# Patient Record
Sex: FEMALE | Race: WHITE | NOT HISPANIC OR LATINO | Employment: FULL TIME | URBAN - METROPOLITAN AREA
[De-identification: names, ages, dates, MRNs, and addresses within clinical notes are randomized per-mention and may not be internally consistent; named-entity substitution may affect disease eponyms.]

---

## 2017-01-09 ENCOUNTER — HOSPITAL ENCOUNTER (OUTPATIENT)
Dept: RADIOLOGY | Age: 52
Discharge: HOME | End: 2017-01-09
Payer: COMMERCIAL

## 2017-01-09 DIAGNOSIS — C49.A0 GASTROINTESTINAL STROMAL TUMOR (GIST) (HCC): ICD-10-CM

## 2017-01-09 PROCEDURE — 74177 CT ABD & PELVIS W/CONTRAST: CPT

## 2017-01-09 RX ADMIN — IOHEXOL 100 ML: 350 INJECTION, SOLUTION INTRAVENOUS at 15:44

## 2017-01-24 ENCOUNTER — ALLSCRIPTS OFFICE VISIT (OUTPATIENT)
Dept: OTHER | Facility: OTHER | Age: 52
End: 2017-01-24

## 2017-01-24 ENCOUNTER — TRANSCRIBE ORDERS (OUTPATIENT)
Dept: ADMINISTRATIVE | Facility: HOSPITAL | Age: 52
End: 2017-01-24

## 2017-01-24 DIAGNOSIS — C49.A0 MALIGNANT GASTROINTESTINAL STROMAL TUMOR, UNSPECIFIED SITE (HCC): Primary | ICD-10-CM

## 2018-01-01 DIAGNOSIS — Z12.31 ENCOUNTER FOR SCREENING MAMMOGRAM FOR MALIGNANT NEOPLASM OF BREAST: ICD-10-CM

## 2018-01-01 DIAGNOSIS — C49.A0 GASTROINTESTINAL STROMAL TUMOR (GIST) (HCC): ICD-10-CM

## 2018-01-01 DIAGNOSIS — E87.5 HYPERKALEMIA: ICD-10-CM

## 2018-01-14 VITALS
SYSTOLIC BLOOD PRESSURE: 108 MMHG | RESPIRATION RATE: 18 BRPM | WEIGHT: 134 LBS | HEART RATE: 76 BPM | DIASTOLIC BLOOD PRESSURE: 78 MMHG | OXYGEN SATURATION: 97 % | HEIGHT: 62 IN | BODY MASS INDEX: 24.66 KG/M2 | TEMPERATURE: 97.4 F

## 2018-01-16 ENCOUNTER — HOSPITAL ENCOUNTER (OUTPATIENT)
Dept: RADIOLOGY | Age: 53
Discharge: HOME | End: 2018-01-16
Payer: COMMERCIAL

## 2018-01-16 DIAGNOSIS — C49.A0 GASTROINTESTINAL STROMAL TUMOR (GIST) (HCC): ICD-10-CM

## 2018-01-16 PROCEDURE — 74177 CT ABD & PELVIS W/CONTRAST: CPT

## 2018-01-16 PROCEDURE — 71260 CT THORAX DX C+: CPT

## 2018-01-16 RX ADMIN — IOHEXOL 100 ML: 350 INJECTION, SOLUTION INTRAVENOUS at 16:16

## 2018-01-23 ENCOUNTER — TRANSCRIBE ORDERS (OUTPATIENT)
Dept: ADMINISTRATIVE | Facility: HOSPITAL | Age: 53
End: 2018-01-23

## 2018-01-23 ENCOUNTER — ALLSCRIPTS OFFICE VISIT (OUTPATIENT)
Dept: OTHER | Facility: OTHER | Age: 53
End: 2018-01-23

## 2018-01-23 DIAGNOSIS — C49.A0 STROMAL TUMOR OF DIGESTIVE SYSTEM (HCC): Primary | ICD-10-CM

## 2018-01-24 NOTE — PROGRESS NOTES
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 A0)   2  Encounter for screening mammogram for breast cancer (V76 12) (Z12 31)   3  Hyperkalemia (276 7) (E87 5)    Plan   Encounter for screening mammogram for breast cancer, GIST (gastrointestinal stromal    tumor), malignant, Hyperkalemia    · (1) BASIC METABOLIC PROFILE; Status:Active; Requested TJE:[id number]; Perform:[facility]; KAREN:[id number];REVESFD;MGL:[id number] for screening mammogram for breast cancer, GIST (gastrointestinal stromal tumor), malignant, Hyperkalemia; Ordered By:Aaron Oshea;   · (1) CBC/PLT/DIFF; Status:Active; Requested DVI:[id number];    Perform:[facility]; IOE:[id number]; Last Updated By:Tim Almanza; 1/23/2018 4:17:52 PM;Ordered;For:Encounter for screening mammogram for breast cancer, GIST (gastrointestinal stromal tumor), malignant, Hyperkalemia; Ordered By:Aaron Oshea;   · (1) COMPREHENSIVE METABOLIC PANEL; Status:Active; Requested ZYC:[id number];    Perform:[facility]; GSB:[id number]; Last Updated By:Tim Almanza; 1/23/2018 4:17:52 PM;Ordered;For:Encounter for screening mammogram for breast cancer, GIST (gastrointestinal stromal tumor), malignant, Hyperkalemia; Ordered By:Aaron Oshea;   · Follow-up visit in 1 year Evaluation and Treatment  Follow-up  Status: Hold For -    Scheduling  Requested for: [id number]   Ordered; For: Encounter for screening mammogram for breast cancer, GIST (gastrointestinal stromal tumor), malignant, Hyperkalemia; Ordered By: Harini Ruiz Performed:  Due: 65FDO4953   · * CT CHEST ABDOMEN PELVIS W CONTRAST; Status:Need Information - Financial    Authorization; Requested WOC:[id number] 03:30PM;    Perform:[facility]; MCL:[id number];  Last Updated By:Tim Almanza; 1/23/2018 4:27:36 PM;Ordered;For:Encounter for screening mammogram for breast cancer, GIST (gastrointestinal stromal tumor), malignant, Hyperkalemia; Ordered By:Praneeth Oshea Junior;   · MAMMO SCREENING BILATERAL W 3D & CAD; Status:Active; Requested XGP:[id number]    04:00PM;    Perform:[facility]; Order Comments:HARIS LAMAR; RNZ:[id number]; Last Updated By:Satya Almanza; 1/23/2018 4:28:20 PM;Ordered;For:Encounter for screening mammogram for breast cancer, GIST (gastrointestinal stromal tumor), malignant, Hyperkalemia; Ordered By:Praneeth Oshea Junior; Discussion/Summary   Discussion Summary:    Glennon Olszewski remains in clinical remission of gastrointestinal stromal tumor of the duodenum, 3 5 cm, 3 mitotic figures per 25 HPF, resected in April 2011  Annual abdominal and pelvic CT is recommended according to NCCN guidelines version 1  2018 for GI ST   is due for bilateral mammogram including follow-up of the 1 3 cm focal asymmetry at the 12 o'clock position of the right breast 7 cm from the nipple  patient is aware to seek medical attention for abdominal pain, change in bowel habits, excessive fatigue, further problems arise  Otherwise, I plan to see her again in one year with contrast-enhanced CT scan of the abdomen and pelvis at that time  Chief Complaint   Chief Complaint Free Text Note Form: Glennon Olszewski was seen in followup today in regards to gastrointestinal stromal tumor of the duodenum, 3 5 cm, 3 mitotic figures per 25 HPF, resected in April 2011  History of Present Illness   HPI: She has been feeling well  She completed 3 years of Gleevec 400 mg daily in June 2014  She continues to get regular exercise walking the dog 1mile per day  Previous Therapy:    Gastrointestinal stromal tumor 3 5 cm of the duodenum  The tumor is present at the peripheral tissue edges, three mitotic figures per 25 HPF, Ki-67 index almost 5%, laparoscopic resection of the duodenal mass in April 2011, imatinib from June 2011 until June 2014  Review of Systems   Complete-Female:      Constitutional: She has been feeling well        Eyes: no eyesight problems  ENT: no nosebleeds-- and-- no hearing loss  Cardiovascular: no chest pain-- and-- no lower extremity edema  Respiratory: no shortness of breath-- and-- no cough  Gastrointestinal: no abdominal pain,-- no constipation-- and-- no diarrhea  Musculoskeletal: No joint pain or back pain  Neurological: no headache-- and-- no difficulty walking  Psychiatric: no emotional problems  Hematologic/Lymphatic: no tendency for easy bruising  Active Problems   1  Anemia (285 9) (D64 9)   2  Breast asymmetry (611 89) (N64 89)   3  GIST (gastrointestinal stromal tumor), 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) (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 A0)    Surgical History   1  History of Hysterectomy    Family History   Mother    1  Family history of diabetes mellitus (V18 0) (Z83 3)  Aunt    2  Family history of malignant neoplasm of brain (V16 8) (Z80 8)    Social History    · Former smoker (V15 82) (T10 385)   · No alcohol use    Current Meds    1  Daily Vitamin Oral Tablet; Therapy: (Recorded:36Qtr6204) to Recorded   2  PROzac 10 MG TABS; Therapy: (Recorded:51Snm8300) to Recorded    Allergies   1  No Known Drug Allergies    Vitals   Vital Signs    Recorded: 03TFS0045 03:43PM   Temperature 98 4 F   Heart Rate 80   Respiration 18   Systolic 633   Diastolic 80   Height 5 ft 1 5 in   Weight 136 lb    BMI Calculated 25 28   BSA Calculated 1 61   O2 Saturation 98     Physical Exam        Constitutional She appears well  Eyes EOMI  Ears, Nose, Mouth, and Throat Oropharynx is clear  Pulmonary      Auscultation of lungs: Clear to auscultation  Cardiovascular Heart has regular rate and rhythm  -- No lower extremity edema  Abdomen      Abdomen: Non-tender, no masses  Liver and spleen: No hepatomegaly or splenomegaly  Lymphatic      Palpation of lymph nodes in neck: No lymphadenopathy  -- No axillary or inguinal lymphadenopathy        Musculoskeletal      Gait and station: Normal  Neurologic Neurological is grossly intact  Psychiatric      Orientation to person, place, and time: Normal        Mood and affect: Normal        Additional Exam:  (Breast examination was not done today  )  ECOG 0       Results/Data   * CT CHEST ABDOMEN PELVIS W CONTRAST [id number] 03:16PM Kesha Stewart Order Number: [id number]       - Patient Instructions: [address]      Test Name Result Flag Reference   CT CHEST ABDOMEN PELVIS W CONTRAST (Report)     CT CHEST, ABDOMEN AND PELVIS WITH IV CONTRAST           INDICATION: History of gastrointestinal stromal tumor of the duodenum resected April 2011  This is an annual follow up  No recurrent disease on the prior annual follow-up  COMPARISON: CT abdomen pelvis 1/9/2017  CT chest abdomen pelvis 1/16/2015  TECHNIQUE: CT examination of the chest, abdomen and pelvis was performed  Reformatted images were created in axial, sagittal, and coronal planes  Radiation dose length product (DLP) for this visit: 298 mGy-cm   This examination, like all CT scans performed in the [facility], was performed utilizing techniques to minimize radiation dose exposure, including the use of iterative       reconstruction and automated exposure control  IV Contrast: 100 mL of iohexol (OMNIPAQUE)  350 Multi-dose         Enteric Contrast: Enteric contrast was administered  FINDINGS:           CHEST           LUNGS: Stable 3 mm right middle lobe nodule is unchanged over 2 years and therefore benign  No new nodules or pulmonary findings  Mild left basilar scarring  PLEURA: Unremarkable  HEART/GREAT VESSELS: Unremarkable for patient's age  MEDIASTINUM AND DANNY: Unremarkable  CHEST WALL AND LOWER NECK:    Unremarkable  ABDOMEN           LIVER/BILIARY TREE: Tiny scattered stable hepatic hypodensities unchanged from prior studies  GALLBLADDER: No calcified gallstones  No pericholecystic inflammatory change  SPLEEN: Unremarkable  PANCREAS: Unremarkable  ADRENAL GLANDS: Unremarkable  KIDNEYS/URETERS: Unremarkable  No hydronephrosis  STOMACH AND BOWEL: Unremarkable  APPENDIX: No findings to suggest appendicitis  ABDOMINOPELVIC CAVITY: No ascites or free intraperitoneal air  No lymphadenopathy  VESSELS: Unremarkable for patient's age  PELVIS           REPRODUCTIVE ORGANS: Unremarkable for patient's age  URINARY BLADDER: Unremarkable  ABDOMINAL WALL/INGUINAL REGIONS: Unremarkable  OSSEOUS STRUCTURES: No acute fracture or destructive osseous lesion  IMPRESSION:           No signs of recurrent, residual or metastatic malignancy within the chest, abdomen or pelvis in this patient with resected duodenal gastrointestinal stromal tumor in 2011  Workstation performed: [id number]           Signed by:      Calli Ramos MD      1/17/18      Results Form:    Results      Radiology Bilateral screening mammogram from July 21, 2016: Breast tissue is heterogeneously dense, asymmetry in the subareolar region of the right breast measuring 12 mm requires additional mammographic views and breast ultrasound  Additional views of the right breast and right breast ultrasound on August 4, 2016: There is no persistent abnormality with additional mammographic imaging and no solid or cystic masses on targeted ultrasound, six-month follow-up right breast mammogram is recommended to confirm stability of this fibroglandular pattern  scan of the abdomen and pelvis with contrast from January 9, 2017: There are stable tiny hypodense lesions in the liver dating back to 2014 and are likely benign, stomach and bowel are unremarkable, no abdominal or pelvic lymphadenopathy   right breast mammogram and ultrasound on February 7, 2017: There is redemonstration of a 1 3 cm focal asymmetry with the appearance of confluent fibroglandular tissue unchanged from the prior study of 6 months ago  scan of the chest, abdomen pelvis with contrast from January 16, 2018: There is a stable 3 mm right middle lobe nodule unchanged for over 2 years, no recurrence, residual or metastatic malignancy within the chest, abdomen and pelvis  Lab Results From January 6, 2018: WBC is 5 2, hemoglobin 14 1, platelets 094, creatinine 0 85, potassium 5 6, alk-phos 100, bili 0 5, AST 18, ALT 31        Signatures    Electronically signed by : SHERMAN Martinez ; Jan 23 2018  5:24PM EST                       (Author)

## 2019-01-11 ENCOUNTER — HOSPITAL ENCOUNTER (OUTPATIENT)
Dept: RADIOLOGY | Age: 54
Discharge: HOME | End: 2019-01-11
Payer: COMMERCIAL

## 2019-01-11 DIAGNOSIS — C49.A0 STROMAL TUMOR OF DIGESTIVE SYSTEM (HCC): ICD-10-CM

## 2019-01-11 PROCEDURE — 71260 CT THORAX DX C+: CPT

## 2019-01-11 PROCEDURE — 74177 CT ABD & PELVIS W/CONTRAST: CPT

## 2019-01-11 RX ADMIN — IOHEXOL 100 ML: 350 INJECTION, SOLUTION INTRAVENOUS at 15:29

## 2019-01-22 ENCOUNTER — OFFICE VISIT (OUTPATIENT)
Dept: HEMATOLOGY ONCOLOGY | Facility: CLINIC | Age: 54
End: 2019-01-22
Payer: COMMERCIAL

## 2019-01-22 VITALS
DIASTOLIC BLOOD PRESSURE: 78 MMHG | HEIGHT: 62 IN | HEART RATE: 79 BPM | OXYGEN SATURATION: 98 % | SYSTOLIC BLOOD PRESSURE: 122 MMHG | RESPIRATION RATE: 18 BRPM | BODY MASS INDEX: 25.03 KG/M2 | TEMPERATURE: 98.1 F | WEIGHT: 136 LBS

## 2019-01-22 DIAGNOSIS — C49.A3 GASTROINTESTINAL STROMAL TUMOR (GIST) OF DUODENUM (HCC): Primary | ICD-10-CM

## 2019-01-22 PROCEDURE — 99214 OFFICE O/P EST MOD 30 MIN: CPT | Performed by: INTERNAL MEDICINE

## 2019-01-22 RX ORDER — FLUOXETINE 10 MG/1
TABLET, FILM COATED ORAL
COMMUNITY

## 2019-01-22 NOTE — PATIENT INSTRUCTIONS
The  patient is aware to seek medical attention for abdominal pain, change in bowel habits, excessive fatigue, or if new problems arise

## 2019-01-22 NOTE — PROGRESS NOTES
1/22/2019    Sandra Leaver     She has been feeling well  She gets regular exercise including walking her dog from mi per day    Hematology/Oncology History:    Gastrointestinal stromal tumor 3 5 cm of the duodenum  The tumor is present at the peripheral tissue edges, three mitotic figures per 25 HPF, Ki-67 index almost 5%, laparoscopic resection of the duodenal mass in April 2011, imatinib from June 2011 until June 2014  Cancer surveillance:    Colonoscopy was done by Jelani Cramer on January 4, 2019:  Normal colonoscopy, surveillance in 10 years is recommended     Review of systems:      Constitutional: She has been feeling well, she denies chills or sweats  HEENT: She denies nose bleeds  She denies oral cavity or throat soreness  Cardiovascular:  She denies chest pain, no edema  Respiratory:  She denies cough or dyspnea  GI:  Her appetite has been good  No abdominal pain  Bowel habits have been formed and regular  Musculoskeletal:  She denies joint pain or back pain  Neurologic: She denies headache or paresthesias  She denies difficulty walking  Skin:  She denies rash  Psychiatric:  She denies emotional problems  Hematologic:  She denies easy bruising  Physical Examination:    Blood pressure 122/78, pulse 79, temperature 98 1 °F (36 7 °C), resp  rate 18, height 5' 1 5" (1 562 m), weight 61 7 kg (136 lb), SpO2 98 %  Body surface area is 1 61 meters squared  She appears well  EOMI  No hearing deficit  Orophaynx clear  No lymphadenopathy of the neck  No axillary lymphadenopathy  (Breast examination was not done today )   Lungs are clear bilaterally  Heart has regular rate and rhythm  No hepatic or splenic enlargement  No inguinal lymphadenopathy  No lower extremity edema  No ecchymoses  Normal gait and station  Neurological is grossly intact  Oriented x3, normal mood and affect      ECOG 0    Laboratory:    From January 19, 2019:  WBC 3 7 with ANC 2 2, hemoglobin 13 6, platelets 430, creatinine 0 85, alk-phos 99, bili 0 7, AST 15, ALT 20    Bilateral screening mammogram from February 1, 2018:  Breast tissue is heterogeneously dense, no suspicious masses, calcifications or other abnormalities    CT of the chest, abdomen pelvis with contrast from January 11, 2019: There is a stable 3 mm RML nodule, mediastinum and hilar are unremarkable, there are subcentimeter hepatic lesions which most likely represent hepatic cysts, spleen, pancreas, adrenals, kidneys are unremarkable, there is an 18 x 9 mm structure extending intramurally from the posterior gastric body which could represent a normal fold or intraluminal gastric material verses recurrent gist, no abdominal pelvic lymphadenopathy, no destructive osseous lesion    Assessment/Plan:    Pamula Kawasaki remains in clinical remission of gastrointestinal stromal tumor of the duodenum, 3 5 cm, 3 mitotic figures per 25 HPF, resected in April 2011  Annual abdominal and pelvic CT is recommended according to NCCN guidelines version 1  2018 for GIST, and the patient is in agreement with CT scanning in 1 year  She is agreeable to endoscopic evaluation of the intraluminal structure extending from the posterior gastric wall and arrangements are being made for follow-up with Fazal Benitez in this regard  I reviewed this case with Dr Patel by telephone today  The patient is scheduled for bilateral mammogram for breast cancer screening on February 6, 2019  She is aware to seek medical attention for abdominal pain, change in bowel habits, excessive fatigue, further problems arise  Otherwise, I plan to see her again in one year

## 2020-01-14 ENCOUNTER — HOSPITAL ENCOUNTER (OUTPATIENT)
Dept: RADIOLOGY | Age: 55
Discharge: HOME | End: 2020-01-14
Payer: COMMERCIAL

## 2020-01-14 DIAGNOSIS — C49.A3 GASTROINTESTINAL STROMAL TUMOR (GIST) OF DUODENUM (HCC): ICD-10-CM

## 2020-01-14 PROCEDURE — 71260 CT THORAX DX C+: CPT

## 2020-01-14 PROCEDURE — 74177 CT ABD & PELVIS W/CONTRAST: CPT

## 2020-01-14 RX ADMIN — IOHEXOL 100 ML: 350 INJECTION, SOLUTION INTRAVENOUS at 08:31

## 2020-01-21 ENCOUNTER — OFFICE VISIT (OUTPATIENT)
Dept: HEMATOLOGY ONCOLOGY | Facility: CLINIC | Age: 55
End: 2020-01-21
Payer: COMMERCIAL

## 2020-01-21 VITALS
WEIGHT: 136 LBS | TEMPERATURE: 98.6 F | HEIGHT: 62 IN | BODY MASS INDEX: 25.03 KG/M2 | OXYGEN SATURATION: 98 % | HEART RATE: 78 BPM | SYSTOLIC BLOOD PRESSURE: 120 MMHG | RESPIRATION RATE: 16 BRPM | DIASTOLIC BLOOD PRESSURE: 80 MMHG

## 2020-01-21 DIAGNOSIS — E87.5 HYPERKALEMIA: ICD-10-CM

## 2020-01-21 DIAGNOSIS — C49.A3 GASTROINTESTINAL STROMAL TUMOR (GIST) OF DUODENUM (HCC): Primary | ICD-10-CM

## 2020-01-21 PROCEDURE — 99214 OFFICE O/P EST MOD 30 MIN: CPT | Performed by: INTERNAL MEDICINE

## 2020-01-21 NOTE — PROGRESS NOTES
1/21/2020    Yadiel Been    She has been feeling well  She continues to get regular exercise including walking her dog a1/2 mile per day  She has been time  EGD was done on January 28, 2019: There was a small sliding hiatal hernia, otherwise normal EGD  Hematology/Oncology History:    Gastrointestinal stromal tumor 3 5 cm of the duodenum  The tumor is present at the peripheral tissue edges, three mitotic figures per 25 HPF, Ki-67 index almost 5%, laparoscopic resection of the duodenal mass in April 2011, imatinib from June 2011 until June 2014       Cancer surveillance:     Colonoscopy was done by Noble Arzate on January 4, 2019: Normal colonoscopy, surveillance in 10 years is recommended    Review of systems:       Constitutional: She has been feeling well, she denies chills or sweats  HEENT: She denies nose bleeds  She denies oral cavity or throat soreness  Cardiovascular:  She denies chest pain, no edema  Respiratory:  She denies cough or dyspnea  GI:  Her appetite has been good  No abdominal pain  Bowel habits have been formed and regular  Musculoskeletal:  She denies joint pain or back pain  Neurologic: She denies headache or paresthesias  She denies difficulty walking  Skin:  She denies rash  Psychiatric:  She denies emotional problems  Hematologic:  She denies easy bruising  Physical Examination:    Blood pressure 120/80, pulse 78, temperature 98 6 °F (37 °C), temperature source Tympanic, resp  rate 16, height 5' 1 5" (1 562 m), weight 61 7 kg (136 lb), SpO2 98 %  Body surface area is 1 61 meters squared  She appears well  EOMI  No hearing deficit  Orophaynx clear  No lymphadenopathy of the neck  No axillary lymphadenopathy  (Breast examination was not done today )   Lungs are clear bilaterally  Heart has regular rate and rhythm  No hepatic or splenic enlargement  No inguinal lymphadenopathy  No lower extremity edema  No ecchymoses      Normal gait and station  Neurological is grossly intact  Oriented x3, normal mood and affect      Laboratory:    From January 11, 2020:  WBC is 4 8, hemoglobin 13 9, platelets 045, creatinine 0 85, calcium 8 9, potassium 5 3, alk-phos 96, bili 0 6, AST 17, ALT 25, 25 hydroxy vitamin-D is 30    Contrast enhanced CT of the chest, abdomen and pelvis from January 14, 2020: There is a stable 3 mm RML nodule, mediastinum and hilar are unremarkable, stable subcentimeter hepatic cysts, spleen, pancreas, adrenals, kidneys are unremarkable, no abdominal pelvic lymphadenopathy, no destructive osseous lesion  Assessment/Plan:    Lacey Madison remains in clinical remission of gastrointestinal stromal tumor of the duodenum, 3 5 cm, 3 mitotic figures per 25 HPF, resected in April 2011  Annual abdominal and pelvic CT is recommended according to NCCN guidelines for GIST, and the patient is in agreement with CT scanning in 1 year      3D screening bilateral mammogram for breast cancer screening is recommended  A follow-up BMP is recommended in regards to the mildly elevated potassium      She is aware to seek medical attention for abdominal pain, change in bowel habits, excessive fatigue, other new problems arise  Otherwise, plan to see her again in 1 year  Today's office visit required 45 minutes, over 50% of the time was utilized to review diagnostic tests, impressions and recommendations

## 2020-01-21 NOTE — PATIENT INSTRUCTIONS
She is aware to seek medical attention for abdominal pain, change in bowel habits, excessive fatigue, other new problems arise

## 2020-01-22 ENCOUNTER — TELEPHONE (OUTPATIENT)
Dept: HEMATOLOGY ONCOLOGY | Facility: CLINIC | Age: 55
End: 2020-01-22

## 2020-01-22 ENCOUNTER — DOCUMENTATION (OUTPATIENT)
Dept: HEMATOLOGY ONCOLOGY | Facility: CLINIC | Age: 55
End: 2020-01-22

## 2020-11-13 ENCOUNTER — TELEPHONE (OUTPATIENT)
Dept: HEMATOLOGY ONCOLOGY | Facility: CLINIC | Age: 55
End: 2020-11-13

## 2021-01-11 DIAGNOSIS — C49.A3 GASTROINTESTINAL STROMAL TUMOR (GIST) OF DUODENUM (HCC): Primary | ICD-10-CM

## 2021-01-14 ENCOUNTER — TELEPHONE (OUTPATIENT)
Dept: HEMATOLOGY ONCOLOGY | Facility: CLINIC | Age: 56
End: 2021-01-14

## 2021-01-14 NOTE — TELEPHONE ENCOUNTER
Call from Bandar Villa at [facility] states she sees a duplicate order from Dr Shabbir Pizarro  There is only one order from Dr Shabbir Pizarro generated 1/11/2021  Abndar Butter aware

## 2021-01-19 ENCOUNTER — HOSPITAL ENCOUNTER (OUTPATIENT)
Dept: RADIOLOGY | Age: 56
Discharge: HOME | End: 2021-01-19
Payer: COMMERCIAL

## 2021-01-19 DIAGNOSIS — C49.A3 GASTROINTESTINAL STROMAL TUMOR (GIST) OF DUODENUM (HCC): ICD-10-CM

## 2021-01-19 PROCEDURE — 71260 CT THORAX DX C+: CPT

## 2021-01-19 PROCEDURE — 74177 CT ABD & PELVIS W/CONTRAST: CPT

## 2021-01-19 PROCEDURE — G1004 CDSM NDSC: HCPCS

## 2021-01-19 RX ADMIN — IOHEXOL 100 ML: 350 INJECTION, SOLUTION INTRAVENOUS at 08:23

## 2021-01-26 ENCOUNTER — TELEPHONE (OUTPATIENT)
Dept: HEMATOLOGY ONCOLOGY | Facility: CLINIC | Age: 56
End: 2021-01-26

## 2021-01-26 ENCOUNTER — TELEMEDICINE (OUTPATIENT)
Dept: HEMATOLOGY ONCOLOGY | Facility: CLINIC | Age: 56
End: 2021-01-26
Payer: COMMERCIAL

## 2021-01-26 DIAGNOSIS — C49.A3 GASTROINTESTINAL STROMAL TUMOR (GIST) OF DUODENUM (HCC): Primary | ICD-10-CM

## 2021-01-26 PROCEDURE — 99214 OFFICE O/P EST MOD 30 MIN: CPT | Performed by: INTERNAL MEDICINE

## 2021-01-26 NOTE — TELEPHONE ENCOUNTER
Patient had virtual f/u apt today with Dr Jami Howell  Called pt and scheduled 1 yr f/u - Thursday 01/27/2022 w/Alaina LUNDBERG at the [facility]  CT scan scheduled for Friday 01/21/2022 at [facility] at 8:00 a m  PT knows prep  Patient also stated that Dr Jami Howell stated if she decided not to have the CT it was okay to cancel

## 2021-01-26 NOTE — PROGRESS NOTES
Virtual Brief Visit    Assessment/Plan:  1  Gastrointestinal stromal tumor (GIST) of duodenum (HCC)   the patient was educated about the CT scan of the chest abdomen pelvis which was done on 01/19/2021 and was negative for any hint of metastatic disease or recurrence of her  Resected GIST    the patient was told that the her initial diagnosis was about 10 years ago and the NCCN guidelines does not specify how long the annual scanning surveillance should be continued beyond 10 years  The patient was told that the the chance of recurrence is very minimal after 10 years  The decision was made to get another CT scan of the chest abdomen pelvis prior to her next visit in a year from now for close monitoring  Subsequent imaging will be discussed at the next visit  - CBC and differential; Future  - Comprehensive metabolic panel; Future  - LD,Blood; Future  - CT chest abdomen pelvis w contrast; Future    Problem List Items Addressed This Visit        Digestive    Gastrointestinal stromal tumor (GIST) of duodenum ([facility] [address] ) - Primary    Relevant Orders    CBC and differential    Comprehensive metabolic panel    LD,Blood    CT chest abdomen pelvis w contrast                Reason for visit is   Chief Complaint   Patient presents with    Virtual Brief Visit        Encounter provider Verito Gunn MD    Provider located at [address] [place] [address], [address] [address]  [phone]    Recent Visits  No visits were found meeting these conditions  Showing recent visits within past 7 days and meeting all other requirements     Today's Visits  Date Type Provider Dept   01/26/21 Lady Napoles MD [facility]   Showing today's visits and meeting all other requirements     Future Appointments  No visits were found meeting these conditions     Showing future appointments within next 150 days and meeting all other requirements After connecting through telephone, the patient was identified by name and date of birth  Deni Hahn was informed that this is a telemedicine visit and that the visit is being conducted through telephone  My office door was closed  No one else was in the room  She acknowledged consent and understanding of privacy and security of the platform  The patient has agreed to participate and understands she can discontinue the visit at any time  Patient is aware this is a billable service  Subjective    Deni Hahn is a 48 y o  female  HPI      This is a very pleasant 60-year-old female with history of Gastrointestinal stromal tumor 3 5 cm of the duodenum  The tumor is present at the peripheral tissue edges, three mitotic figures per 25 HPF, Ki-67 index almost 5%, laparoscopic resection of the duodenal mass in April 2011, imatinib from June 2011 until June 2014  she was under the supervision of Dr Tabitha Abreu was  Monitoring her worth the CT scans of the chest abdomen pelvis on a yearly basis  The patient stated that she is entirely asymptomatic  Her most recent blood work from 01/16/2021 showed low white cell count of 3 4 with normal hemoglobin hematocrit and platelet count  Her CMP was normal with normal vitamin-D and slightly higher than average close trial of 212  Past Medical History:   Diagnosis Date    [facility]     GIST (gastrointestinal stroma tumor), [facility]        History reviewed  No pertinent surgical history  Current Outpatient Medications   Medication Sig Dispense Refill    Cholecalciferol 2000 units CAPS Take 1 capsule by mouth      FLUoxetine (PROzac) 10 MG tablet Take by mouth      Multiple Vitamin (MULTI-VITAMIN DAILY PO) Take by mouth       No current facility-administered medications for this visit  No Known Allergies    Review of Systems   Constitutional: Negative for chills and fever  HENT: Negative for ear pain and sore throat      Eyes: Negative for pain and visual disturbance  Respiratory: Negative for cough and shortness of breath  Cardiovascular: Negative for chest pain and palpitations  Gastrointestinal: Negative for abdominal pain and vomiting  Genitourinary: Negative for dysuria and hematuria  Musculoskeletal: Negative for arthralgias and back pain  Skin: Negative for color change and rash  Neurological: Negative for seizures and syncope  All other systems reviewed and are negative  There were no vitals filed for this visit  I spent 20 minutes directly with the patient during this visit    VIRTUAL VISIT DISCLAIMER    Yadiel Villegas acknowledges that she has consented to an online visit or consultation  She understands that the online visit is based solely on information provided by her, and that, in the absence of a face-to-face physical evaluation by the physician, the diagnosis she receives is both limited and provisional in terms of accuracy and completeness  This is not intended to replace a full medical face-to-face evaluation by the physician  Yadiel Villegas understands and accepts these terms

## 2022-01-27 ENCOUNTER — OFFICE VISIT (OUTPATIENT)
Dept: HEMATOLOGY ONCOLOGY | Facility: CLINIC | Age: 57
End: 2022-01-27
Payer: COMMERCIAL

## 2022-01-27 VITALS
TEMPERATURE: 98.6 F | DIASTOLIC BLOOD PRESSURE: 80 MMHG | OXYGEN SATURATION: 100 % | BODY MASS INDEX: 24.07 KG/M2 | WEIGHT: 130.8 LBS | HEIGHT: 62 IN | HEART RATE: 61 BPM | RESPIRATION RATE: 18 BRPM | SYSTOLIC BLOOD PRESSURE: 116 MMHG

## 2022-01-27 DIAGNOSIS — C49.A3 GASTROINTESTINAL STROMAL TUMOR (GIST) OF DUODENUM (HCC): Primary | ICD-10-CM

## 2022-01-27 PROCEDURE — 99214 OFFICE O/P EST MOD 30 MIN: CPT | Performed by: NURSE PRACTITIONER

## 2022-01-27 NOTE — PROGRESS NOTES
Hematology/Oncology Outpatient Follow-up  Julien Reis 55 y o  female 1965 [id number]    Date:  1/27/2022      Assessment and Plan:  1  Gastrointestinal stromal tumor (GIST) of duodenum ([facility] [address] )  Patient was diagnosed with duodenal gist tumor 2011  She is status post surgical resection and 3 years worth of adjuvant Gleevec that she completed 06/2014  No obvious hint of recurrence based off of today's examination  After discussing with Dr Martha Roberts at her last office visit she would like to forego any future imaging studies since she has been asymptomatic with negative imaging studies  Was diagnosed about 10 years ago  We will continue to monitor her and her laboratory studies on an annual basis  Imaging studies will only be pursued if she develops concerning symptoms  Did encourage her to continue to follow-up with her PCP on a regular basis  Is up-to-date with her screening colonoscopies and mammography; scheduled for next mammogram next month  She reports that she consumes a pretty healthy diet with lots of fruits and vegetables and also exercises on a regular basis  - CBC and differential; Future  - Comprehensive metabolic panel; Future  - LD,Blood; Future  - C-reactive protein; Future  - Sedimentation rate, automated; Future    HPI:  Patient presents today for a follow-up visit  She is doing well and has no complaints  Denies any constitutional symptoms, denies any abdominal pain, diarrhea, constipation or bleeding from any site  She is up-to-date with her colonoscopies and mammograms  Is scheduled for her annual mammogram next month  She was scheduled for a repeat CT scan of the chest abdomen pelvis before today's visit but decided to cancel it after she was given the option at her last office visit with Dr Martha Roberts of foregoing since it has been about 10 years since her diagnosis without any symptoms or signs of recurrence on imaging studies      Her most recent laboratory studies from 01/21/2022 showed essentially normal CBC hemoglobin 13 3  Potassium is slightly higher than average (she does admit to eating bananas on a daily basis) otherwise normal CMP  LDH is normal 150  Oncology History Overview Note   Gastrointestinal stromal tumor 3 5 cm of the duodenum  The tumor is present at the peripheral tissue edges, three mitotic figures per 25 HPF, Ki-67 index almost 5%, laparoscopic resection of the duodenal mass in April 2011, imatinib from June 2011 until June 2014  Was under the supervision of Dr Juvencio Alanis who was monitoring her with CT scans of the chest abdomen pelvis on a yearly basis  She transferred her care to us after he retired  Gastrointestinal stromal tumor (GIST) of duodenum ([facility] [address] )   1/22/2019 Initial Diagnosis    Gastrointestinal stromal tumor (GIST) of duodenum (HCC)         ROS: Review of Systems   Constitutional: Negative for activity change, appetite change, chills, fatigue, fever and unexpected weight change  Reports hot flashes typically more than 1 per day   HENT: Negative for congestion, mouth sores, nosebleeds, sore throat and trouble swallowing  Eyes: Negative  Respiratory: Negative for cough, chest tightness and shortness of breath  Cardiovascular: Negative for chest pain, palpitations and leg swelling  Gastrointestinal: Negative for abdominal distention, abdominal pain, blood in stool, constipation, diarrhea, nausea and vomiting  Genitourinary: Negative for difficulty urinating, dysuria, frequency, hematuria and urgency  Musculoskeletal: Negative for arthralgias, back pain, gait problem, joint swelling and myalgias  Skin: Negative for color change, pallor and rash  Neurological: Negative for dizziness, weakness, light-headedness, numbness and headaches  Hematological: Negative for adenopathy  Does not bruise/bleed easily  Psychiatric/Behavioral: Negative for dysphoric mood and sleep disturbance   The patient is not nervous/anxious  Past Medical History:   Diagnosis Date    Cancer [facility])     GIST (gastrointestinal stroma tumor), malignant, colon ([facility] [address] )        No past surgical history on file  Social History     Socioeconomic History    Marital status: /Civil Union     Spouse name: None    Number of children: None    Years of education: None    Highest education level: None   Occupational History    None   Tobacco Use    Smoking status: Never Smoker    Smokeless tobacco: Never Used   Substance and Sexual Activity    Alcohol use: Yes     Comment: occasional wine    Drug use: No    Sexual activity: None   Other Topics Concern    None   Social History Narrative    None     Social Determinants of Health     Financial Resource Strain: Not on file   Food Insecurity: Not on file   Transportation Needs: Not on file   Physical Activity: Not on file   Stress: Not on file   Social Connections: Not on file   Intimate Partner Violence: Not on file   Housing Stability: Not on file       Family History   Family history unknown: Yes       No Known Allergies      Current Outpatient Medications:     Cholecalciferol 2000 units CAPS, Take 1 capsule by mouth, Disp: , Rfl:     FLUoxetine (PROzac) 10 MG tablet, Take by mouth, Disp: , Rfl:     Multiple Vitamin (MULTI-VITAMIN DAILY PO), Take by mouth, Disp: , Rfl:       Physical Exam:  /80 (BP Location: Left arm, Patient Position: Sitting, Cuff Size: Adult)   Pulse 61   Temp 98 6 °F (37 °C) (Temporal)   Resp 18   Ht 5' 1 5" (1 562 m)   Wt 59 3 kg (130 lb 12 8 oz)   SpO2 100%   BMI 24 31 kg/m²     Physical Exam  Vitals reviewed  Constitutional:       General: She is not in acute distress  Appearance: She is well-developed  She is not diaphoretic  HENT:      Head: Normocephalic and atraumatic  Eyes:      General: No scleral icterus  Conjunctiva/sclera: Conjunctivae normal       Pupils: Pupils are equal, round, and reactive to light     Neck: Thyroid: No thyromegaly  Cardiovascular:      Rate and Rhythm: Normal rate and regular rhythm  Heart sounds: Normal heart sounds  No murmur heard  Pulmonary:      Effort: Pulmonary effort is normal  No respiratory distress  Breath sounds: Normal breath sounds  Chest:   Breasts:      Right: No axillary adenopathy  Left: No axillary adenopathy  Abdominal:      General: There is no distension  Palpations: Abdomen is soft  There is no hepatomegaly or splenomegaly  Tenderness: There is no abdominal tenderness  Musculoskeletal:         General: No swelling  Normal range of motion  Cervical back: Normal range of motion and neck supple  Lymphadenopathy:      Cervical: No cervical adenopathy  Upper Body:      Right upper body: No axillary adenopathy  Left upper body: No axillary adenopathy  Skin:     General: Skin is warm and dry  Findings: No erythema or rash  Neurological:      General: No focal deficit present  Mental Status: She is alert and oriented to person, place, and time  Psychiatric:         Mood and Affect: Mood normal  Affect is blunt  Behavior: Behavior normal  Behavior is cooperative  Thought Content: Thought content normal          Judgment: Judgment normal            Patient voiced understanding and agreement in the above discussion  Aware to contact our office with questions/symptoms in the interim  This note has been generated by voice recognition software system  Therefore, there may be spelling, grammar, and or syntax errors  Please contact if questions arise

## 2022-10-14 ENCOUNTER — TELEPHONE (OUTPATIENT)
Dept: HEMATOLOGY ONCOLOGY | Facility: CLINIC | Age: 57
End: 2022-10-14

## 2022-10-14 NOTE — TELEPHONE ENCOUNTER
Called pt and let her know that I needed to reschedule her appt with Dr Rikki Raphael from 1/31/23 to 1/25/23 due to Dr artemio hilliard and to call back if date and time does not work for her

## 2023-01-19 ENCOUNTER — TELEPHONE (OUTPATIENT)
Dept: HEMATOLOGY ONCOLOGY | Facility: MEDICAL CENTER | Age: 58
End: 2023-01-19

## 2023-01-19 NOTE — TELEPHONE ENCOUNTER
I spoke with the patient in regards to her lab work that needs to be completed prior to her appt on 1/25/23 at 3:00pm  Patient states she will have them completed at [facility] in [place]  Informed patient that I will fax over the scripts to that [facility] location  Patient understood

## 2023-01-25 ENCOUNTER — TELEMEDICINE (OUTPATIENT)
Dept: HEMATOLOGY ONCOLOGY | Facility: CLINIC | Age: 58
End: 2023-01-25

## 2023-01-25 DIAGNOSIS — C49.A3 GASTROINTESTINAL STROMAL TUMOR (GIST) OF DUODENUM (HCC): Primary | ICD-10-CM

## 2023-01-25 NOTE — PROGRESS NOTES
Virtual Brief Visit    Patient is located in the following state in which I hold an active license [place]      Assessment/Plan:  1  Gastrointestinal stromal tumor (GIST) of duodenum ([facility] )  The patient was diagnosed with duodenal GIST tumor which was resected around 2011  She has no GI symptoms whatsoever  I did explain to the patient that she is cured and does not need to have any follow-up with us unless there is any significant her overall condition  I did ask her to follow-up with her PCP on regular basis as recommended  She seems to be up-to-date on mammograms and colonoscopies  Problem List Items Addressed This Visit    None      Recent Visits  No visits were found meeting these conditions  Showing recent visits within past 7 days and meeting all other requirements  Today's Visits  Date Type Provider Dept   01/25/23 Antoinette Pittman MD [facility] Hem Onc [facility]   Showing today's visits and meeting all other requirements  Future Appointments  No visits were found meeting these conditions  Showing future appointments within next 150 days and meeting all other requirements     Oncology History Overview Note    Gastrointestinal stromal tumor 3 5 cm of the duodenum  The tumor is present at the peripheral tissue edges, three mitotic figures per 25 HPF, Ki-67 index almost 5%, laparoscopic resection of the duodenal mass in April 2011, imatinib from June 2011 until June 2014       Was under the supervision of Dr Neda Wei who was monitoring her with CT scans of the chest abdomen pelvis on a yearly basis  She transferred her care to us after he retired        Gastrointestinal stromal tumor (GIST) of duodenum ([facility] )    1/22/2019 Initial Diagnosis      Gastrointestinal stromal tumor (GIST) of duodenum (HCC)      HPI:  The patient stated that she is not symptomatic whatsoever  Blood work on 1/21/2022 which showed normal creatinine of 0 8 with normal liver enzymes    Potassium was slightly above normal 5 3   Cholesterol was 211 with normal vitamin D level        Visit Time    Visit Start Time: 3:00pm  Visit Stop Time: 3:13pm  Total Visit Duration: 13 minutes

## 2024-09-03 ENCOUNTER — OFFICE VISIT (OUTPATIENT)
Dept: FAMILY MEDICINE CLINIC | Facility: CLINIC | Age: 59
End: 2024-09-03
Payer: COMMERCIAL

## 2024-09-03 VITALS
HEART RATE: 77 BPM | OXYGEN SATURATION: 97 % | DIASTOLIC BLOOD PRESSURE: 80 MMHG | SYSTOLIC BLOOD PRESSURE: 150 MMHG | TEMPERATURE: 97.9 F | HEIGHT: 61 IN | BODY MASS INDEX: 24.35 KG/M2 | WEIGHT: 129 LBS

## 2024-09-03 DIAGNOSIS — M77.8 TENDINITIS OF THUMB: ICD-10-CM

## 2024-09-03 DIAGNOSIS — E55.9 VITAMIN D DEFICIENCY: ICD-10-CM

## 2024-09-03 DIAGNOSIS — C49.A3 GASTROINTESTINAL STROMAL TUMOR (GIST) OF DUODENUM (HCC): ICD-10-CM

## 2024-09-03 DIAGNOSIS — F33.0 MILD EPISODE OF RECURRENT MAJOR DEPRESSIVE DISORDER (HCC): Primary | ICD-10-CM

## 2024-09-03 DIAGNOSIS — Z13.220 SCREENING FOR HYPERLIPIDEMIA: ICD-10-CM

## 2024-09-03 DIAGNOSIS — Z13.1 SCREENING FOR DIABETES MELLITUS: ICD-10-CM

## 2024-09-03 PROCEDURE — 3725F SCREEN DEPRESSION PERFORMED: CPT | Performed by: FAMILY MEDICINE

## 2024-09-03 PROCEDURE — 99204 OFFICE O/P NEW MOD 45 MIN: CPT | Performed by: FAMILY MEDICINE

## 2024-09-03 RX ORDER — FLUOXETINE 20 MG/1
20 TABLET, FILM COATED ORAL DAILY
Qty: 90 TABLET | Refills: 0 | Status: SHIPPED | OUTPATIENT
Start: 2024-09-03

## 2024-09-03 NOTE — PROGRESS NOTES
Ambulatory Visit  Name: Kelly Campoverde      : 1965      MRN: [id number]  Encounter Provider: Victor Manuel Hall MD  Encounter Date: 9/3/2024   Encounter department: [facility]ILY [facility]ACT[facility] AT [facility]    Assessment & Plan   1. Mild episode of recurrent major depressive disorder (HCC)  Assessment & Plan:  Controlled on fluoxetine 20 mg daily.  2. Gastrointestinal stromal tumor (GIST) of duodenum (HCC)  Assessment & Plan:  Removed in  . Completed 10 years of gleevec and surveillance without reoccurrence.  Discharged from oncology on 2023.  Patient denies any symptoms today.  Orders:  -     Comprehensive metabolic panel; Future  -     Vitamin D 25 hydroxy; Future  3. Vitamin D deficiency  Assessment & Plan:  Controlled.  Continue vitamin D supplementation.  Repeat labs before next appointment.  Orders:  -     Vitamin D 25 hydroxy; Future  4. Screening for hyperlipidemia  -     Lipid panel; Future  5. Screening for diabetes mellitus  -     Comprehensive metabolic panel; Future       History of Present Illness     Patient presents office today to establish care.  Has history of duodenal tumor as well as vitamin D deficiency and depression.  Tumor was incidentally found in the emergency room and was removed in .  She reports she followed with oncology for about 10 years and completed many follow-up imaging and 10 years of Gleevec before being discharged.  She saw oncology last about a year or 2 ago.    She is on Prozac.  She has been on Prozac for over 20 years.  She is on 20 mg daily.  Reports her depression is well-controlled.  It was initially 10 mg but was bumped up to 20 mg a few years ago.  She says it is working well now and has no issues.  She will need a refill in a few weeks.    She also has history of vitamin D deficiency.  She is on vitamin D supplementation daily.  No issues.    Has concerns for right thumb pain.  This been going on for a while and she addressed it with her previous PCP but  "was told that it is because she is a .  Pain is not too bothersome.  It is intermittent and she only notices it when she opens jars.  She does not have constant pain.  She can deal with the pain and it is not an issue.  She just would like to know if this is something that is going to worsen.        Review of Systems   All other systems reviewed and are negative.      Objective     /80 (BP Location: Left arm, Patient Position: Sitting, Cuff Size: Standard)   Pulse 77   Temp 97.9 °F (36.6 °C) (Tympanic)   Ht 5' 1\" (1.549 m)   Wt 58.5 kg (129 lb)   SpO2 97%   BMI 24.37 kg/m²     Physical Exam  Vitals and nursing note reviewed.   Constitutional:       General: She is not in acute distress.     Appearance: Normal appearance. She is well-developed. She is not ill-appearing, toxic-appearing or diaphoretic.   HENT:      Head: Normocephalic and atraumatic.   Eyes:      General:         Right eye: No discharge.         Left eye: No discharge.      Extraocular Movements: Extraocular movements intact.      Conjunctiva/sclera: Conjunctivae normal.   Cardiovascular:      Rate and Rhythm: Normal rate.   Pulmonary:      Effort: Pulmonary effort is normal.   Skin:     General: Skin is warm and dry.      Capillary Refill: Capillary refill takes less than 2 seconds.   Neurological:      Mental Status: She is alert and oriented to person, place, and time.   Psychiatric:         Mood and Affect: Mood normal.         Behavior: Behavior normal.         Thought Content: Thought content normal.         Judgment: Judgment normal.       Administrative Statements     "

## 2024-09-03 NOTE — ASSESSMENT & PLAN NOTE
Removed in 2011 . Completed 10 years of gleevec and surveillance without reoccurrence.  Discharged from oncology on 1/25/2023.  Patient denies any symptoms today.

## 2024-09-03 NOTE — ASSESSMENT & PLAN NOTE
Stable.  Symptoms only noticeable when opening jars.  We will continue to monitor.  Recommended supportive care as needed.  She will let me know if symptoms get worse and I will place referral to orthopedic hand specialist.

## 2024-09-30 DIAGNOSIS — F33.0 MILD EPISODE OF RECURRENT MAJOR DEPRESSIVE DISORDER (HCC): Primary | ICD-10-CM

## 2024-09-30 NOTE — TELEPHONE ENCOUNTER
Reason for call:   [x] Refill   [] Prior Auth  [] Other:     Office:   [x] PCP/Provider - Victor Manuel Hall MD / Karlo HURD    [] Specialty/Provider -     Medication: FLUoxetine 20 MG tablet      Dose/Frequency: Take 1 tablet (20 mg total) by mouth daily     Quantity: 90    Pharmacy: [facility] [address] [place] [address] [address]     Does the patient have enough for 3 days?   [x] Yes   [] No - Send as HP to POD

## 2024-10-01 RX ORDER — FLUOXETINE 20 MG/1
20 TABLET, FILM COATED ORAL DAILY
Qty: 90 TABLET | Refills: 1 | Status: SHIPPED | OUTPATIENT
Start: 2024-10-01 | End: 2024-10-02

## 2024-10-02 DIAGNOSIS — F33.0 MILD EPISODE OF RECURRENT MAJOR DEPRESSIVE DISORDER (HCC): Primary | ICD-10-CM

## 2024-10-02 RX ORDER — FLUOXETINE 20 MG/1
20 TABLET, FILM COATED ORAL DAILY
Qty: 90 TABLET | Refills: 0 | Status: SHIPPED | OUTPATIENT
Start: 2024-10-02 | End: 2024-10-02

## 2024-12-30 DIAGNOSIS — F33.0 MILD EPISODE OF RECURRENT MAJOR DEPRESSIVE DISORDER (HCC): ICD-10-CM

## 2024-12-30 NOTE — TELEPHONE ENCOUNTER
Authorizing Provider:  Victor Manuel Hall MD- [facility]      FLUoxetine (PROzac) 20 mg capsule-Take 1 capsule (20 mg total) by mouth daily    [facility] - [place] - [address]    No HP needed

## 2025-02-28 ENCOUNTER — TELEPHONE (OUTPATIENT)
Dept: FAMILY MEDICINE CLINIC | Facility: CLINIC | Age: 60
End: 2025-02-28

## 2025-02-28 NOTE — TELEPHONE ENCOUNTER
Called pt regarding upcoming annual physical appt for 3/3/25. Per chart review, last physical completed at [facility] 3/11/25, rescheduled appt for 3/31/25 in order to meet the 366 day criteria for annual physical exams.

## 2025-03-31 ENCOUNTER — OFFICE VISIT (OUTPATIENT)
Dept: FAMILY MEDICINE CLINIC | Facility: CLINIC | Age: 60
End: 2025-03-31
Payer: COMMERCIAL

## 2025-03-31 VITALS
HEART RATE: 75 BPM | HEIGHT: 61 IN | WEIGHT: 132 LBS | BODY MASS INDEX: 24.92 KG/M2 | RESPIRATION RATE: 18 BRPM | DIASTOLIC BLOOD PRESSURE: 68 MMHG | SYSTOLIC BLOOD PRESSURE: 120 MMHG | TEMPERATURE: 97.1 F | OXYGEN SATURATION: 98 %

## 2025-03-31 DIAGNOSIS — Z00.00 ANNUAL PHYSICAL EXAM: Primary | ICD-10-CM

## 2025-03-31 DIAGNOSIS — C49.A3 GASTROINTESTINAL STROMAL TUMOR (GIST) OF DUODENUM (HCC): ICD-10-CM

## 2025-03-31 DIAGNOSIS — F33.0 MILD EPISODE OF RECURRENT MAJOR DEPRESSIVE DISORDER (HCC): ICD-10-CM

## 2025-03-31 PROCEDURE — 99396 PREV VISIT EST AGE 40-64: CPT | Performed by: FAMILY MEDICINE

## 2025-03-31 PROCEDURE — 99214 OFFICE O/P EST MOD 30 MIN: CPT | Performed by: FAMILY MEDICINE

## 2025-03-31 NOTE — ASSESSMENT & PLAN NOTE
Tumor removed in 2011.  She completed 10 years of Gleevec and surveillance without recurrence.  She was discharged on 1/25/2023.  Last colonoscopy was in 2019 with recommendations to repeat in 10 years for 2029.  Patient remains asymptomatic.  Will continue to monitor.

## 2025-03-31 NOTE — PROGRESS NOTES
"Name: Kelly Campoverde      : 1965      MRN: [id number]  Encounter Provider: Victor Manuel Hall MD  Encounter Date: 3/31/2025   Encounter department: FAMILY PRACTICE AT [place]  :  Assessment & Plan  Gastrointestinal stromal tumor (GIST) of duodenum (HCC)    Tumor removed in .  She completed 10 years of Gleevec and surveillance without recurrence.  She was discharged on 2023.  Last colonoscopy was in  with recommendations to repeat in 10 years for .  Patient remains asymptomatic.  Will continue to monitor.         Mild episode of recurrent major depressive disorder (HCC)  Controlled on fluoxetine 20 mg daily.         Annual physical exam  Get labs done.  Has mammogram scheduled at [facility] later this week.              History of Present Illness   Patient presents office today to follow-up on conditions and annual exam.  No acute concerns.  Did not get lab work done before visit.  She says she forgot.  No concerns today about her depression.  She says her mood is stable on Prozac.  Doing well.  No refills.  She has history of gastrointestinal tumor and had colonoscopy done in .  She was discharged from hematology and oncology out 2 years ago.  She has not had any reoccurrence of symptoms.  She was clear for 10 years.  She reports she has a mammogram scheduled for later this week at [facility].      Review of Systems   All other systems reviewed and are negative.      Objective   /68 (BP Location: Left arm, Patient Position: Sitting, Cuff Size: Standard)   Pulse 75   Temp (!) 97.1 °F (36.2 °C) (Tympanic)   Resp 18   Ht 5' 1\" (1.549 m)   Wt 59.9 kg (132 lb)   SpO2 98%   BMI 24.94 kg/m²      Physical Exam  Vitals and nursing note reviewed.   Constitutional:       General: She is not in acute distress.     Appearance: Normal appearance. She is well-developed. She is not ill-appearing, toxic-appearing or diaphoretic.   HENT:      Head: " Normocephalic and atraumatic.      Right Ear: External ear normal.      Left Ear: External ear normal.      Nose: Nose normal.      Mouth/Throat:      Mouth: Mucous membranes are moist.      Pharynx: Oropharynx is clear. No oropharyngeal exudate.   Eyes:      General:         Right eye: No discharge.         Left eye: No discharge.      Extraocular Movements: Extraocular movements intact.      Conjunctiva/sclera: Conjunctivae normal.      Pupils: Pupils are equal, round, and reactive to light.   Cardiovascular:      Rate and Rhythm: Normal rate and regular rhythm.      Pulses: Normal pulses.      Heart sounds: Normal heart sounds. No murmur heard.  Pulmonary:      Effort: Pulmonary effort is normal. No respiratory distress.      Breath sounds: Normal breath sounds. No stridor.   Abdominal:      General: Bowel sounds are normal. There is no distension.      Palpations: Abdomen is soft. There is no mass.      Tenderness: There is no abdominal tenderness.      Hernia: No hernia is present.   Musculoskeletal:         General: No swelling or tenderness. Normal range of motion.      Cervical back: Normal range of motion and neck supple. No rigidity or tenderness.   Lymphadenopathy:      Cervical: No cervical adenopathy.   Skin:     General: Skin is warm and dry.      Capillary Refill: Capillary refill takes less than 2 seconds.      Coloration: Skin is not jaundiced.      Findings: No bruising, erythema, lesion or rash.   Neurological:      General: No focal deficit present.      Mental Status: She is alert and oriented to person, place, and time.      Cranial Nerves: No cranial nerve deficit.      Sensory: No sensory deficit.      Motor: No weakness.      Coordination: Coordination normal.      Gait: Gait normal.      Deep Tendon Reflexes: Reflexes normal.   Psychiatric:         Mood and Affect: Mood normal.         Behavior: Behavior normal.         Thought Content: Thought content normal.         Judgment: Judgment  normal.

## 2025-04-01 ENCOUNTER — APPOINTMENT (OUTPATIENT)
Dept: LAB | Facility: CLINIC | Age: 60
End: 2025-04-01
Payer: COMMERCIAL

## 2025-04-01 DIAGNOSIS — Z13.1 SCREENING FOR DIABETES MELLITUS: ICD-10-CM

## 2025-04-01 DIAGNOSIS — C49.A3 GASTROINTESTINAL STROMAL TUMOR (GIST) OF DUODENUM (HCC): ICD-10-CM

## 2025-04-01 DIAGNOSIS — Z13.220 SCREENING FOR HYPERLIPIDEMIA: ICD-10-CM

## 2025-04-01 DIAGNOSIS — E55.9 VITAMIN D DEFICIENCY: ICD-10-CM

## 2025-04-01 LAB
25(OH)D3 SERPL-MCNC: 44.9 NG/ML (ref 30–100)
ALBUMIN SERPL BCG-MCNC: 4.6 G/DL (ref 3.5–5)
ALP SERPL-CCNC: 83 U/L (ref 34–104)
ALT SERPL W P-5'-P-CCNC: 15 U/L (ref 7–52)
ANION GAP SERPL CALCULATED.3IONS-SCNC: 7 MMOL/L (ref 4–13)
AST SERPL W P-5'-P-CCNC: 17 U/L (ref 13–39)
BILIRUB SERPL-MCNC: 0.76 MG/DL (ref 0.2–1)
BUN SERPL-MCNC: 18 MG/DL (ref 5–25)
CALCIUM SERPL-MCNC: 9.2 MG/DL (ref 8.4–10.2)
CHLORIDE SERPL-SCNC: 100 MMOL/L (ref 96–108)
CHOLEST SERPL-MCNC: 206 MG/DL (ref ?–200)
CO2 SERPL-SCNC: 30 MMOL/L (ref 21–32)
CREAT SERPL-MCNC: 0.86 MG/DL (ref 0.6–1.3)
GFR SERPL CREATININE-BSD FRML MDRD: 74 ML/MIN/1.73SQ M
GLUCOSE P FAST SERPL-MCNC: 91 MG/DL (ref 65–99)
HDLC SERPL-MCNC: 86 MG/DL
LDLC SERPL CALC-MCNC: 105 MG/DL (ref 0–100)
NONHDLC SERPL-MCNC: 120 MG/DL
POTASSIUM SERPL-SCNC: 3.9 MMOL/L (ref 3.5–5.3)
PROT SERPL-MCNC: 7 G/DL (ref 6.4–8.4)
SODIUM SERPL-SCNC: 137 MMOL/L (ref 135–147)
TRIGL SERPL-MCNC: 76 MG/DL (ref ?–150)

## 2025-04-01 PROCEDURE — 36415 COLL VENOUS BLD VENIPUNCTURE: CPT

## 2025-04-01 PROCEDURE — 82306 VITAMIN D 25 HYDROXY: CPT

## 2025-04-01 PROCEDURE — 80061 LIPID PANEL: CPT

## 2025-04-01 PROCEDURE — 80053 COMPREHEN METABOLIC PANEL: CPT

## 2025-04-03 ENCOUNTER — RESULTS FOLLOW-UP (OUTPATIENT)
Dept: FAMILY MEDICINE CLINIC | Facility: CLINIC | Age: 60
End: 2025-04-03

## 2025-04-03 NOTE — RESULT ENCOUNTER NOTE
Called and left VM for patient regarding blood work results and advised patient to begin a modification to her diet

## 2025-04-15 ENCOUNTER — VBI (OUTPATIENT)
Dept: ADMINISTRATIVE | Facility: OTHER | Age: 60
End: 2025-04-15

## 2025-04-15 NOTE — TELEPHONE ENCOUNTER
04/15/25 12:32 PM     Chart reviewed for   Cervical Cancer Screening    ; nothing is submitted to the patient's insurance at this time.     NARESH RAMIREZ MA   [facility]

## 2025-06-23 DIAGNOSIS — F33.0 MILD EPISODE OF RECURRENT MAJOR DEPRESSIVE DISORDER (HCC): ICD-10-CM

## 2025-06-23 NOTE — TELEPHONE ENCOUNTER
Medication Refill Request       Medication: fluoxetine    Dose/Frequency: 20mg daily     Quantity: 90    Pharmacy: [facility]    Office:   [x] PCP/Provider -   [] Specialty/Provider -     Does the patient have enough for 3 days?   [x] Yes   [] No - Send as HP to POD    Is the patient completely out of the medication or does not have enough until the next business day?  [] Yes - send to Call Hub  [] No - Send as HP to POD